# Patient Record
Sex: MALE | Race: ASIAN | Employment: FULL TIME | URBAN - METROPOLITAN AREA
[De-identification: names, ages, dates, MRNs, and addresses within clinical notes are randomized per-mention and may not be internally consistent; named-entity substitution may affect disease eponyms.]

---

## 2017-02-08 ENCOUNTER — APPOINTMENT (OUTPATIENT)
Dept: GENERAL RADIOLOGY | Age: 27
End: 2017-02-08
Attending: NURSE PRACTITIONER
Payer: COMMERCIAL

## 2017-02-08 ENCOUNTER — HOSPITAL ENCOUNTER (EMERGENCY)
Age: 27
Discharge: HOME | End: 2017-02-08
Attending: STUDENT IN AN ORGANIZED HEALTH CARE EDUCATION/TRAINING PROGRAM
Payer: COMMERCIAL

## 2017-02-08 VITALS
RESPIRATION RATE: 16 BRPM | WEIGHT: 155 LBS | BODY MASS INDEX: 24.91 KG/M2 | TEMPERATURE: 98.5 F | OXYGEN SATURATION: 97 % | SYSTOLIC BLOOD PRESSURE: 128 MMHG | HEIGHT: 66 IN | DIASTOLIC BLOOD PRESSURE: 71 MMHG | HEART RATE: 65 BPM

## 2017-02-08 DIAGNOSIS — S92.901B OPEN FRACTURE OF RIGHT FOOT, INITIAL ENCOUNTER: Primary | ICD-10-CM

## 2017-02-08 PROCEDURE — 74011000258 HC RX REV CODE- 258: Performed by: PHYSICIAN ASSISTANT

## 2017-02-08 PROCEDURE — 74011250636 HC RX REV CODE- 250/636: Performed by: NURSE PRACTITIONER

## 2017-02-08 PROCEDURE — 77030018836 HC SOL IRR NACL ICUM -A

## 2017-02-08 PROCEDURE — 77030011881 HC TAPE CST FBRGLS BSNM -A

## 2017-02-08 PROCEDURE — 96361 HYDRATE IV INFUSION ADD-ON: CPT

## 2017-02-08 PROCEDURE — 73630 X-RAY EXAM OF FOOT: CPT

## 2017-02-08 PROCEDURE — 74011250636 HC RX REV CODE- 250/636: Performed by: PHYSICIAN ASSISTANT

## 2017-02-08 PROCEDURE — 75810000053 HC SPLINT APPLICATION

## 2017-02-08 PROCEDURE — 90715 TDAP VACCINE 7 YRS/> IM: CPT | Performed by: NURSE PRACTITIONER

## 2017-02-08 PROCEDURE — 74011000250 HC RX REV CODE- 250

## 2017-02-08 PROCEDURE — 77030002916 HC SUT ETHLN J&J -A

## 2017-02-08 PROCEDURE — 75810000293 HC SIMP/SUPERF WND  RPR

## 2017-02-08 PROCEDURE — 77030028224 HC PDNG CST BSNM -A

## 2017-02-08 PROCEDURE — 99285 EMERGENCY DEPT VISIT HI MDM: CPT

## 2017-02-08 PROCEDURE — 96376 TX/PRO/DX INJ SAME DRUG ADON: CPT

## 2017-02-08 PROCEDURE — 96365 THER/PROPH/DIAG IV INF INIT: CPT

## 2017-02-08 PROCEDURE — 96375 TX/PRO/DX INJ NEW DRUG ADDON: CPT

## 2017-02-08 RX ORDER — HYDROMORPHONE HYDROCHLORIDE 2 MG/ML
1 INJECTION, SOLUTION INTRAMUSCULAR; INTRAVENOUS; SUBCUTANEOUS ONCE
Status: DISCONTINUED | OUTPATIENT
Start: 2017-02-08 | End: 2017-02-08 | Stop reason: SDUPTHER

## 2017-02-08 RX ORDER — SULFAMETHOXAZOLE AND TRIMETHOPRIM 800; 160 MG/1; MG/1
1 TABLET ORAL 2 TIMES DAILY
Qty: 20 TAB | Refills: 0 | Status: SHIPPED | OUTPATIENT
Start: 2017-02-08 | End: 2017-02-18

## 2017-02-08 RX ORDER — LIDOCAINE HYDROCHLORIDE 10 MG/ML
20 INJECTION, SOLUTION EPIDURAL; INFILTRATION; INTRACAUDAL; PERINEURAL ONCE
Status: COMPLETED | OUTPATIENT
Start: 2017-02-08 | End: 2017-02-08

## 2017-02-08 RX ORDER — LIDOCAINE HYDROCHLORIDE 10 MG/ML
INJECTION, SOLUTION EPIDURAL; INFILTRATION; INTRACAUDAL; PERINEURAL
Status: COMPLETED
Start: 2017-02-08 | End: 2017-02-08

## 2017-02-08 RX ORDER — HYDROMORPHONE HYDROCHLORIDE 1 MG/ML
1 INJECTION, SOLUTION INTRAMUSCULAR; INTRAVENOUS; SUBCUTANEOUS ONCE
Status: COMPLETED | OUTPATIENT
Start: 2017-02-08 | End: 2017-02-08

## 2017-02-08 RX ORDER — HYDROCODONE BITARTRATE AND ACETAMINOPHEN 5; 325 MG/1; MG/1
1 TABLET ORAL
Qty: 20 TAB | Refills: 0 | Status: SHIPPED | OUTPATIENT
Start: 2017-02-08

## 2017-02-08 RX ORDER — ONDANSETRON 2 MG/ML
4 INJECTION INTRAMUSCULAR; INTRAVENOUS
Status: COMPLETED | OUTPATIENT
Start: 2017-02-08 | End: 2017-02-08

## 2017-02-08 RX ORDER — CEPHALEXIN 500 MG/1
500 CAPSULE ORAL 4 TIMES DAILY
Qty: 40 CAP | Refills: 0 | Status: SHIPPED | OUTPATIENT
Start: 2017-02-08 | End: 2017-02-18

## 2017-02-08 RX ADMIN — LIDOCAINE HYDROCHLORIDE 20 ML: 10 INJECTION, SOLUTION EPIDURAL; INFILTRATION; INTRACAUDAL; PERINEURAL at 12:15

## 2017-02-08 RX ADMIN — HYDROMORPHONE HYDROCHLORIDE 1 MG: 1 INJECTION, SOLUTION INTRAMUSCULAR; INTRAVENOUS; SUBCUTANEOUS at 11:01

## 2017-02-08 RX ADMIN — SODIUM CHLORIDE 3 G: 900 INJECTION, SOLUTION INTRAVENOUS at 14:22

## 2017-02-08 RX ADMIN — ONDANSETRON 4 MG: 2 INJECTION INTRAMUSCULAR; INTRAVENOUS at 11:00

## 2017-02-08 RX ADMIN — TETANUS TOXOID, REDUCED DIPHTHERIA TOXOID AND ACELLULAR PERTUSSIS VACCINE, ADSORBED 0.5 ML: 5; 2.5; 8; 8; 2.5 SUSPENSION INTRAMUSCULAR at 11:04

## 2017-02-08 RX ADMIN — SODIUM CHLORIDE 1000 ML: 900 INJECTION, SOLUTION INTRAVENOUS at 10:59

## 2017-02-08 NOTE — ED NOTES
Order for abx not verified. Message already sent to pharmacy by charge RN. Will administer when available. Splint to be applied by EDT. Will re-assess for pain, monitor VS and check for medication orders. Family currently in room.

## 2017-02-08 NOTE — ED NOTES
Assisting Jd MARTINEZ with irrigation, changing out canisters.  phone acquired and assisted with to provide information to patient related to next steps. Katie Flores advised of need for posterior splint per MICHELLE Miles and Katie Gatica RN notified of wound care and next step for appt on Friday for surgery and that Case management was needed to assure patient had medications:  Pain and antibiotic.

## 2017-02-08 NOTE — ED NOTES
IVF infusing, call bell in reach. Language line phones in room as needed. Pt resting on stretcher with right foot elevated. VSS. Given warm blanket. Pharmacist currently at bedside.

## 2017-02-08 NOTE — CONSULTS
FRACTURE CONSULT NOTE    Subjective:     Date of Consultation:  2017  Referring Physician:  Felecia Phoenix NP    Damon Martin is a 31 y.o. 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 Injury occurred approximately 10am per pt. The pt. States he has been NPO since 0900hrs this am. A translater phone was used for discussion. Pain minimal at this time. Denies NT. There are no active problems to display for this patient. History reviewed. No pertinent family history. Social History   Substance Use Topics    Smoking status: Never Smoker    Smokeless tobacco: Not on file    Alcohol use No     History reviewed. No pertinent past medical history. History reviewed. No pertinent past surgical history. Prior to Admission medications    Not on File     No current facility-administered medications for this encounter. No current outpatient prescriptions on file. No Known Allergies     Review of Systems:  A comprehensive review of systems was negative except for that written in the HPI. Mental Status: no dementia    Objective:     Patient Vitals for the past 8 hrs:   BP Temp Pulse Resp SpO2 Height Weight   17 1130 133/82 - - - 98 % - -   17 1100 117/62 - - - 98 % - -   17 1025 116/63 98.5 °F (36.9 °C) 65 16 100 % 5' 6\" (1.676 m) 70.3 kg (155 lb)     Temp (24hrs), Av.5 °F (36.9 °C), Min:98.5 °F (36.9 °C), Max:98.5 °F (36.9 °C)      EXAM: I examined pt's R foot. 6cm open laceration to dorsum of R foot over the distal third shaft of the 5th MT. Extensor tendon severed and retracted distally, No debris or FB noted during exploration. Flexion of 5th toe intact, no extension of the 5th toe. Sensation of 5th toe intact. Bleeding controlled.      Imaging Review:   INDICATION: laceration to dorsum of foot from saw.     COMPARISON: None.     FINDINGS: Three views of the right foot demonstrate a comminuted open fracture  of the distal fifth metatarsal shaft. Soft tissue laceration with bone  fragments is noted.     IMPRESSION  IMPRESSION: Comminuted open fracture fifth metatarsal shaft. Labs: No results found for this or any previous visit (from the past 24 hour(s)). Impression:     Open Right 5th Metatarsal Fracture    Plan:   Plan discussed with Dr. Heri Manzanares who consulted with Dr. Tara Allen (foot surgeon). PROCEDURE:  Pt. Gives written consent to irrigation of R foot wound and closing of wound. I used local anesthetic, lidocaine 1% without epi, 15ml around the R foot laceration after cleaning with betadine. Anesthesia adequate. I explored wound, No FB. See Physical Exam above, no change. I then irrigated the wound deep to surface with 3L NaCl using a pulse lavage. Loose approximation of 6cm laceration using #5 sutures, 4.0 nylon. Xeroform applied, kerlex dressing. Short posterior splint and NWB with crutches on RLE. IV Unasyn 3Gm to be given in ER and home on Keflex and Bactrim. Pain meds as needed per ER NP. Pt. Has appt scheduled with Dr. Tara Allen on Friday morning at 0900hrs. Pt. Given card with instructions and  on hospital phone confirms Friday follow up. Nothing to eat or drink after MN Thursday night. Probable surgery Friday afternoon. All questions answered. Thank you for allowing us to take part in this patients care. Reagan Burton PA-C  Orthopaedic Surgery [address]  [address]  [id number] PA-C

## 2017-02-08 NOTE — ED NOTES
Bedside and Verbal shift change report given to Naz Red (oncoming nurse) by Otilia Urrutia (offgoing nurse). Report included the following information SBAR, Kardex, ED Summary, MAR, Recent Results and Med Rec Status.

## 2017-02-08 NOTE — ED TRIAGE NOTES
Pt states he cut himself with a handsaw this morning around 0940 on right foot when trying to cut wood. Bone exposed. Sensation present. +2 right pedal pulse. Cap refill <3 secs.

## 2017-02-08 NOTE — ED PROVIDER NOTES
HPI Comments: This is an otherwise healthy 32 y/o male who presents to the ED with a cc of R foot injury. Hx obtained with assistance of nursing staff and [employer] . Patient states that pta he was using a saw to cut wood and accidentally lacerated the top of his foot with the saw. He notes 5/10 pain to the R foot. States normal sensation. Tetanus is not utd. No other sx or acute medical concerns. pmhx negative  surghx negative  sochx non smoker, employed    Patient is a 31 y.o. male presenting with foot pain. The history is provided by the patient. The history is limited by a language barrier. A  was used. Foot Pain    Pertinent negatives include no numbness. No past medical history on file. No past surgical history on file. No family history on file. Social History     Social History    Marital status: N/A     Spouse name: N/A    Number of children: N/A    Years of education: N/A     Occupational History    Not on file. Social History Main Topics    Smoking status: Not on file    Smokeless tobacco: Not on file    Alcohol use Not on file    Drug use: Not on file    Sexual activity: Not on file     Other Topics Concern    Not on file     Social History Narrative         ALLERGIES: Review of patient's allergies indicates no known allergies. Review of Systems   Musculoskeletal:        Foot pain   Skin: Positive for wound. Allergic/Immunologic: Negative for immunocompromised state. Neurological: Negative for numbness. ROS limited d/t language barrier    Vitals:    02/08/17 1025   BP: 116/63   Pulse: 65   Resp: 16   Temp: 98.5 °F (36.9 °C)   SpO2: 100%   Weight: 70.3 kg (155 lb)   Height: 5' 6\" (1.676 m)            Physical Exam   Constitutional: He is oriented to person, place, and time. He appears well-developed and well-nourished. No distress. HENT:   Head: Normocephalic and atraumatic.    Eyes: Conjunctivae are normal. Right eye exhibits no discharge. Left eye exhibits no discharge. Neck: Normal range of motion. Neck supple. Cardiovascular: Normal rate, regular rhythm and normal heart sounds. Exam reveals no gallop and no friction rub. No murmur heard. Pulmonary/Chest: Effort normal and breath sounds normal. No respiratory distress. He has no wheezes. He has no rales. Abdominal: Soft. He exhibits no distension. There is no tenderness. There is no rebound and no guarding. Musculoskeletal:   RLE: large laceration to distal lateral dorsum of foot with exposed tendon that is lacerated; no active bleeding; limited ROM of 5th toe; Dp pulse 2+; CRT < 2 seconds   Neurological: He is alert and oriented to person, place, and time. He displays no atrophy and no tremor. No sensory deficit. He exhibits normal muscle tone. He displays no seizure activity. GCS eye subscore is 4. GCS verbal subscore is 5. GCS motor subscore is 6. Skin: Skin is warm and dry. He is not diaphoretic. Psychiatric: He has a normal mood and affect. His behavior is normal. Judgment and thought content normal.   Nursing note and vitals reviewed. MDM  Number of Diagnoses or Management Options  Open fracture of right foot, initial encounter:     ED Course     32 y/o male with open fx after injury with saw. He is NVI. Pt given Tdap IM, IV unasyn. Ortho consult placed. Joycelyn Engel PA-C with ortho washed out and loosely approximated the wound. Pt will f/u with ortho in 2 days for likely surgery. Splint placed by nursing staff. He was given follow up instructions by ortho MICHELLE Engel using the language line. I have asked registration to see him to discuss workers comp as this occurred while at work. XR Results (most recent):    Results from Hospital Encounter encounter on 02/08/17   XR FOOT RT MIN 3 V   Narrative EXAM:  XR FOOT RT MIN 3 V    INDICATION:   laceration to dorsum of foot from saw. COMPARISON:  None.     FINDINGS:  Three views of the right foot demonstrate a comminuted open fracture  of the distal fifth metatarsal shaft. Soft tissue laceration with bone  fragments is noted. Impression IMPRESSION:  Comminuted open fracture fifth metatarsal shaft.                 Procedures

## 2017-02-08 NOTE — ED NOTES
Waiting for abx verification by pharmacy. Will administer as available. IVF have finished. Pt resting on stretcher. Pt has been given discharge instructions with care and specific follow up via the language line. Pt states has no additional questions at this time.

## 2017-02-08 NOTE — DISCHARGE INSTRUCTIONS
Fractura de pie: Instrucciones de cuidado - [ Broken Foot: Care Instructions ]  Instrucciones de cuidado    Isabela fractura de pie es la rotura de alberto o más huesos del pie. Puede ocurrir debido a isabela lesión deportiva, isabela caída u otro accidente. Isabela fractura expuesta o abierta se produce cuando el hueso fracturado atraviesa la piel. Si el hueso no [employer], se trata de isabela fractura cerrada. El tratamiento depende de la ubicación y del tipo de fractura en el pie. Podría necesitar isabela tablilla (férula), un yeso o un zapato ortopédico. Ciertos tipos de lesiones podrían necesitar isabela cirugía en algún momento. Cualquiera que sea el tratamiento, puede aliviar los síntomas y ayudar a stockton pie a sanar con cuidados en el hogar. Podría necesitar entre 6 y 6 semanas o más para sanar por completo. Usted sanará mejor si cuida john de sí mismo. Coma isabela variedad de alimentos saludables y no fume. La atención de seguimiento es isabela parte clave de stockton tratamiento y seguridad. Asegúrese de hacer y acudir a todas las citas, y llame a stockton médico si está teniendo problemas. También es isabela buena idea saber los resultados de los exámenes y mantener isabela lista de los medicamentos que kelvin. ¿Cómo puede cuidarse en el hogar? · Sea camilo con los medicamentos. [place] los analgésicos (medicamentos para el dolor) exactamente según las indicaciones. ¨ Si el médico le recetó analgésicos, tómelos según las indicaciones. ¨ Si no está tomando un analgésico recetado, pregúntele a stockton médico si puede susana alberto de [employer]. · Déjese la tablilla hasta la seema de seguimiento. No ponga ningún peso en el pie lesionado. Si le dieron muletas, úselas según las indicaciones. · Colóquese hielo o isabela compresa fría en el pie de 10 a 20 minutos cada sesión. Trate de hacerlo cada 1 o 2 horas lg los siguientes 3 días (cuando esté despierto) o hasta que la hinchazón baje. Póngase un paño calvillo entre el hielo y la piel.   · Eleve el pie adolorido sobre isabela almohada cada vez que se siente o acueste lg los siguientes 3 días. Trate de mantenerlo por encima del nivel del corazón. [place] ayudará a reducir la hinchazón. · Siga las instrucciones de cuidado del yeso que le indique stockton médico. Si tiene isabela tablilla, no se la quite a menos que stockton médico se lo indique. Cuidado del yeso y la tablilla  · Si tiene isabela tablilla removible, pregúntele a stockton médico si está john que se la quite para bañarse. Stock[place] médico podría indicarle que la use todo el tiempo que sea posible. · Cuando se bañe, cubra la tablilla de yeso con plástico y sujételo con [place]. El agua debajo del yeso puede hacer que le dé comezón y dolor en la piel. · Nunca dionte la tablilla. ¿Cuándo debe pedir ayuda? Llame al 911 en cualquier momento que considere que necesita atención de [place]. Por ejemplo, llame si:  · Tiene dolor repentino en el pecho y falta de [place], o tose janette. Llame a stockton médico ahora mismo o busque atención médica inmediata si:  · Tiene más dolor o dolor intenso. · Los dedos del pie están fríos o pálidos, o [place] de color. · Tiene hormigueo, debilidad o entumecimiento en el pie. · El yeso o la tablilla siente demasiado apretado. · No puede  los dedos de los pies. · Tiene señales de un coágulo de Manuel, tales rodger:  ¨ Dolor en la pantorrilla, el muslo, la geeta o detrás de la rodilla. ¨ Enrojecimiento o hinchazón en la pierna o la geeta. Preste especial atención a los cambios en stockton whit y asegúrese de comunicarse con stockton médico si:  · El dolor no mejora en 2 o 3 días. ¿Dónde puede encontrar más información en inglés? [place] a http://maria ines-luis.info/. [employer] C[employer] en la búsqueda para aprender más acerca de \"Fractura de pie: Instrucciones de cuidado - [ Broken Foot: Care Instructions ]. \"  Revisado: 23 Ewen, 2016  Versión del contenido: 11.1  © 1482-2218 [employer].  Las instrucciones de cuidado fueron adaptadas bajo licencia por [employer] (which disclaims liability or warranty for this information). Si usted tiene [place] afección médica o sobre estas instrucciones, siempre pregunte a stockton profesional de whit. [facility] niega toda garantía o responsabilidad por stockton uso de esta información.

## 2017-02-08 NOTE — PROGRESS NOTES
[facility]: MED RECONCILIATION    Comments/Recommendations:   Verifies allergies  Patient reports he does not take any medications at home  Interpretor [id number] used for interaction    Information obtained from: patient    Significant PMH/Disease States: There is no problem list on file for this patient. History reviewed. No pertinent past medical history. Chief Complaint for this Admission:   Chief Complaint   Patient presents with    Foot Pain     Allergies: Review of patient's allergies indicates no known allergies. Prior to Admission Medications:     Medication Documentation Review Audit       Reviewed by Dennis Bhagat PHARMD (Pharmacist) on 02/08/17 at [phone]         Medication Sig Documenting Provider Last Dose Status Taking?                **No Medications to Display**                                Thank you,    Gamal Moya, Ul[place], BCPS